# Patient Record
Sex: FEMALE | Employment: FULL TIME | URBAN - METROPOLITAN AREA
[De-identification: names, ages, dates, MRNs, and addresses within clinical notes are randomized per-mention and may not be internally consistent; named-entity substitution may affect disease eponyms.]

---

## 2023-06-13 ENCOUNTER — OFFICE VISIT (OUTPATIENT)
Age: 43
End: 2023-06-13
Payer: OTHER GOVERNMENT

## 2023-06-13 VITALS — BODY MASS INDEX: 34.53 KG/M2 | HEIGHT: 67 IN | WEIGHT: 220 LBS

## 2023-06-13 DIAGNOSIS — N92.6 IRREGULAR MENSTRUAL CYCLE: Primary | ICD-10-CM

## 2023-06-13 DIAGNOSIS — N92.6 IRREGULAR MENSTRUAL CYCLE: ICD-10-CM

## 2023-06-13 DIAGNOSIS — N76.0 VAGINITIS AND VULVOVAGINITIS: ICD-10-CM

## 2023-06-13 PROCEDURE — 99203 OFFICE O/P NEW LOW 30 MIN: CPT | Performed by: OBSTETRICS & GYNECOLOGY

## 2023-06-13 RX ORDER — CLOTRIMAZOLE AND BETAMETHASONE DIPROPIONATE 10; .64 MG/G; MG/G
CREAM TOPICAL
Qty: 45 G | Refills: 2 | Status: SHIPPED | OUTPATIENT
Start: 2023-06-13 | End: 2023-06-15

## 2023-06-13 RX ORDER — ATORVASTATIN CALCIUM 20 MG/1
20 TABLET, FILM COATED ORAL DAILY
COMMUNITY

## 2023-06-14 LAB
ESTRADIOL SERPL-MCNC: 51.5 PG/ML
FSH SERPL-ACNC: 7.2 MIU/ML
HBA1C MFR BLD: 5.3 % (ref 4.8–5.6)
LH SERPL-ACNC: 7.7 MIU/ML
PROGEST SERPL-MCNC: <0.1 NG/ML
PROLACTIN SERPL-MCNC: 8.1 NG/ML (ref 4.8–23.3)

## 2023-06-16 LAB — DHEA SERPL-MCNC: 203 NG/DL (ref 31–701)

## 2023-06-17 LAB
TESTOST FREE SERPL-MCNC: 1.7 PG/ML (ref 0–4.2)
TESTOST SERPL-MCNC: 30 NG/DL (ref 4–50)

## 2023-06-18 LAB — MIS SERPL-MCNC: 2.5 NG/ML

## 2023-06-19 ENCOUNTER — TELEPHONE (OUTPATIENT)
Age: 43
End: 2023-06-19

## 2023-06-19 NOTE — TELEPHONE ENCOUNTER
Patient was called and a message left for the patient to come to the [place] location for her appointment.

## 2023-06-21 NOTE — PROGRESS NOTES
Oliva Velazquez is a 44 y.o. female, , No LMP recorded. , who presents today for the following:  Chief Complaint   Patient presents with    New Patient     Pt c/o vaginal discharge and irregular cycles. No Known Allergies    Current Outpatient Medications   Medication Sig Dispense Refill    terconazole (TERAZOL 7) 0.4 % vaginal cream Place 1 applicator vaginally nightly for 7 days Place vaginally nightly. 45 g 0    clotrimazole-betamethasone (LOTRISONE) 1-0.05 % cream Apply topically 2 times daily. 45 g 2    atorvastatin (LIPITOR) 20 MG tablet Take 1 tablet by mouth daily       No current facility-administered medications for this visit. Past Medical History:   Diagnosis Date    High cholesterol     PCOS (polycystic ovarian syndrome)        History reviewed. No pertinent surgical history. Family History   Problem Relation Age of Onset    Ovarian Cancer Paternal Grandmother     Diabetes Father     Asthma Mother     Hypertension Mother     Atrial Fibrillation Mother     High Cholesterol Mother     Depression Mother        Social History     Socioeconomic History    Marital status:      Spouse name: Not on file    Number of children: Not on file    Years of education: Not on file    Highest education level: Not on file   Occupational History    Not on file   Tobacco Use    Smoking status: Never    Smokeless tobacco: Never   Vaping Use    Vaping Use: Never used   Substance and Sexual Activity    Alcohol use:  Yes    Drug use: Never    Sexual activity: Yes     Partners: Male   Other Topics Concern    Not on file   Social History Narrative    Not on file     Social Determinants of Health     Financial Resource Strain: Not on file   Food Insecurity: Not on file   Transportation Needs: Not on file   Physical Activity: Not on file   Stress: Not on file   Social Connections: Not on file   Intimate Partner Violence: Not on file   Housing Stability: Not on file

## 2023-06-25 LAB
T4 FREE SERPL DIALY-MCNC: 1.2 NG/DL
TSH SERPL-ACNC: 2.8 UU/ML

## 2023-07-12 ENCOUNTER — OFFICE VISIT (OUTPATIENT)
Age: 43
End: 2023-07-12
Payer: OTHER GOVERNMENT

## 2023-07-12 VITALS
SYSTOLIC BLOOD PRESSURE: 131 MMHG | HEART RATE: 97 BPM | HEIGHT: 67 IN | DIASTOLIC BLOOD PRESSURE: 83 MMHG | RESPIRATION RATE: 16 BRPM | WEIGHT: 216 LBS | BODY MASS INDEX: 33.9 KG/M2 | OXYGEN SATURATION: 96 %

## 2023-07-12 DIAGNOSIS — N92.6 IRREGULAR MENSTRUAL CYCLE: Primary | ICD-10-CM

## 2023-07-12 PROCEDURE — 99213 OFFICE O/P EST LOW 20 MIN: CPT | Performed by: OBSTETRICS & GYNECOLOGY

## 2023-07-12 RX ORDER — MEDROXYPROGESTERONE ACETATE 10 MG/1
10 TABLET ORAL DAILY
Qty: 10 TABLET | Refills: 1 | Status: SHIPPED | OUTPATIENT
Start: 2023-07-12

## 2024-09-20 ENCOUNTER — TRANSCRIBE ORDERS (OUTPATIENT)
Facility: HOSPITAL | Age: 44
End: 2024-09-20

## 2024-09-20 DIAGNOSIS — N63.20 MASS OF LEFT BREAST, UNSPECIFIED QUADRANT: Primary | ICD-10-CM
